# Patient Record
Sex: MALE | Race: WHITE
[De-identification: names, ages, dates, MRNs, and addresses within clinical notes are randomized per-mention and may not be internally consistent; named-entity substitution may affect disease eponyms.]

---

## 2018-07-08 ENCOUNTER — HOSPITAL ENCOUNTER (EMERGENCY)
Dept: HOSPITAL 26 - MED | Age: 23
Discharge: HOME | End: 2018-07-08
Payer: SELF-PAY

## 2018-07-08 VITALS — SYSTOLIC BLOOD PRESSURE: 125 MMHG | DIASTOLIC BLOOD PRESSURE: 81 MMHG

## 2018-07-08 VITALS — WEIGHT: 130 LBS | HEIGHT: 66 IN | BODY MASS INDEX: 20.89 KG/M2

## 2018-07-08 VITALS — SYSTOLIC BLOOD PRESSURE: 128 MMHG | DIASTOLIC BLOOD PRESSURE: 85 MMHG

## 2018-07-08 DIAGNOSIS — B97.89: ICD-10-CM

## 2018-07-08 DIAGNOSIS — J02.8: Primary | ICD-10-CM

## 2018-07-08 NOTE — NUR
Patient discharged with v/s stable. Written and verbal after care instructions 
given and explained. Patient alert, oriented and verbalized understanding of 
instructions. Ambulatory with steady gait. All questions addressed prior to 
discharge. ID band removed. Patient advised to follow up with PMD. Rx of motrin 
given. Patient educated on indication of medication including possible reaction 
and side effects. Opportunity to ask questions provided and answered.

## 2018-07-08 NOTE — NUR
PATIENT PRESENTS TO ED WITH COMPLAINTS OF COLD SYMPTOMS THAT STARTED THIS 
MORNING. PATIENT STATES HE FELL ASLEEP WITH THE AIR CONDITIONER ON AND IS NOW 
CONGESTED AND HAVING BODY ACHES. PATIENT DENIES FEVER. DENIES N/V/D; SKIN IS 
PINK/WARM/DRY; AAOX4 WITH EVEN AND STEADY GAIT; LUNGS CLEAR BL; HR EVEN AND 
REGULAR; PT DENIES ANY CP AND SOB AT THIS TIME; PATIENT STATES PAIN OF 0/10 AT 
THIS TIME; VSS; PATIENT POSITIONED FOR COMFORT; HOB ELEVATED; BEDRAILS UP X1; 
BED DOWN. ER MD MADE AWARE OF PT STATUS.